# Patient Record
Sex: FEMALE | Race: WHITE | Employment: FULL TIME | URBAN - NONMETROPOLITAN AREA
[De-identification: names, ages, dates, MRNs, and addresses within clinical notes are randomized per-mention and may not be internally consistent; named-entity substitution may affect disease eponyms.]

---

## 2021-08-26 ENCOUNTER — OFFICE VISIT (OUTPATIENT)
Dept: OBGYN CLINIC | Age: 31
End: 2021-08-26
Payer: COMMERCIAL

## 2021-08-26 ENCOUNTER — HOSPITAL ENCOUNTER (OUTPATIENT)
Age: 31
Setting detail: SPECIMEN
Discharge: HOME | End: 2021-08-26
Payer: COMMERCIAL

## 2021-08-26 VITALS
WEIGHT: 197.4 LBS | BODY MASS INDEX: 31.72 KG/M2 | SYSTOLIC BLOOD PRESSURE: 118 MMHG | HEIGHT: 66 IN | DIASTOLIC BLOOD PRESSURE: 80 MMHG

## 2021-08-26 DIAGNOSIS — Z13.220 SCREENING FOR LIPID DISORDERS: ICD-10-CM

## 2021-08-26 DIAGNOSIS — N92.6 IRREGULAR MENSES: ICD-10-CM

## 2021-08-26 DIAGNOSIS — N93.0 PCB (POST COITAL BLEEDING): ICD-10-CM

## 2021-08-26 DIAGNOSIS — Z01.419 WOMEN'S ANNUAL ROUTINE GYNECOLOGICAL EXAMINATION: Primary | ICD-10-CM

## 2021-08-26 LAB
DIRECT EXAM: NORMAL
Lab: NORMAL
SPECIMEN DESCRIPTION: NORMAL

## 2021-08-26 PROCEDURE — 99385 PREV VISIT NEW AGE 18-39: CPT | Performed by: NURSE PRACTITIONER

## 2021-08-26 NOTE — PROGRESS NOTES
YEARLY PHYSICAL    Date of service: 2021    Elle Bar  Is a 32 y.o.  female    PT's PCP is: No primary care provider on file. : 1990                                             Subjective:       Patient's last menstrual period was 2021. Are your menses regular: no    OB History    Para Term  AB Living   2 2 2     3   SAB TAB Ectopic Molar Multiple Live Births           1 3      # Outcome Date GA Lbr Husam/2nd Weight Sex Delivery Anes PTL Lv   2A Term  37w0d  6 lb (2.722 kg) M Vag-Spont   ALTAF   2B Term  37w0d  5 lb 14 oz (2.665 kg) F    ALTAF   1 Term  41w0d  7 lb 11 oz (3.487 kg) M Vag-Spont   ALTAF        Social History     Tobacco Use   Smoking Status Never Smoker   Smokeless Tobacco Never Used        Social History     Substance and Sexual Activity   Alcohol Use Yes    Comment: occ       Family History   Problem Relation Age of Onset    Pancreatic Cancer Paternal Grandmother     Diabetes Maternal Grandmother     Cancer Maternal Grandfather         skin    Breast Cancer Maternal Aunt        Any family history of breast or ovarian cancer: Yes    Any family history of blood clots: No    Allergies: Latex and Ceclor [cefaclor]    No current outpatient medications on file.     Social History     Substance and Sexual Activity   Sexual Activity Yes    Partners: Male    Birth control/protection: Surgical    Comment: Tubal       Any bleeding or pain with intercourse: No    Last Yearly:  2015    Last pap: , negative     Last HPV: 2017, negative     Last Mammogram: n/a    Do you do self breast exams: encouraged monthly SBE     Past Medical History:   Diagnosis Date    Abnormal Pap smear of cervix     LGSIL    H/O Bell's palsy     H/O human papillomavirus infection     History of blood transfusion     after twin delivery    Migraine        Past Surgical History:   Procedure Laterality Date    MOUTH SURGERY      OVARIAN CYST REMOVAL      TONSILLECTOMY      TUBAL LIGATION         Family History   Problem Relation Age of Onset    Pancreatic Cancer Paternal Grandmother     Diabetes Maternal Grandmother     Cancer Maternal Grandfather         skin    Breast Cancer Maternal Aunt        Chief Complaint   Patient presents with    Gynecologic Exam     Last pap 4/3/17. Reports menses every 2-3 wks. Reports mood changes and PMS sx. PE:  Vital Signs  Blood pressure 118/80, height 5' 6\" (1.676 m), weight 197 lb 6.4 oz (89.5 kg), last menstrual period 08/20/2021. Estimated body mass index is 31.86 kg/m² as calculated from the following:    Height as of this encounter: 5' 6\" (1.676 m). Weight as of this encounter: 197 lb 6.4 oz (89.5 kg). HPI: Patient presents today for annual exam. Denies breast/pelvic pain. Due for pap. Reports monogamous relationship. Needs lipid screening for insurance program. Additionally, she has complaints off irregular, heavy menses every 2-3 weeks lasting approximately 5 days over the past year. Reports postcoital bleeding more instances than not. Experiences mood swings, fatigue, bloating prior to menses. Review of Systems   Genitourinary: Positive for menstrual problem. Negative for difficulty urinating, dysuria, frequency, pelvic pain, vaginal bleeding and vaginal discharge. Postcoital bleeding  PMS symptoms     All other systems reviewed and are negative. Objective  Heent:   negative   Cor: regular rate and rhythm, no murmurs              Pul:clear to auscultation bilaterally- no wheezes, rales or rhonchi, normal air movement, no respiratory distress      GI: Abdomen soft, non-tender.  BS normal. No masses,  No organomegaly           Extremities: normal strength, tone, and muscle mass, ROM of all joints is normal   Breasts: Breast:normal appearance, no masses or tenderness, No nipple retraction or dimpling, No nipple discharge or bleeding   Pelvic Exam: GENITAL/URINARY:  External Genitalia:  General appearance; normal, Hair distribution; normal, Lesions absent  Urethral Meatus:  Size normal, Location normal, Lesions absent, Prolapse absent  Urethra: Fullness absent, Masses absent  Bladder:  Fullness absent, Masses absent, Tenderness absent, Cystocele absent  Vagina:  General appearance normal, Estrogen effect normal, Discharge absent, Lesions absent, Pelvic support normal  Cervix:  General appearance normal, Lesions absent, Discharge absent, Tenderness absent, Enlargement absent, Nodularity absent  Uterus:  Size normal, Tenderness absent  Adenexa: Masses absent, Tenderness absent  Anus/Perineum:  Lesions absent and Masses absent                                    Vaginal discharge: no vaginal discharge   Chaperone: not present                         Assessment and Plan          Diagnosis Orders   1. Women's annual routine gynecological examination  PAP SMEAR   2. Irregular menses  TSH With Reflex Ft4   3. PCB (post coital bleeding)  Vaginitis DNA Probe   4. Screening for lipid disorders  Lipid, Fasting       Patient needs to call day one of menses for day 7-9 ultrasound. Will complete labs together when she can return fasting. Naima Ryder does not currently have medications on file. Return for gyn US day 7-9 . She was also counseled on her preventative health maintenance recommendations and follow-up. There are no Patient Instructions on file for this visit.     LEIGH Simental NP,8/26/2021 9:40 AM

## 2021-08-27 LAB
HPV SAMPLE: NORMAL
HPV, GENOTYPE 16: NOT DETECTED
HPV, GENOTYPE 18: NOT DETECTED
HPV, HIGH RISK OTHER: NOT DETECTED
HPV, INTERPRETATION: NORMAL
SPECIMEN DESCRIPTION: NORMAL

## 2021-09-07 LAB — CYTOLOGY REPORT: NORMAL

## 2021-09-16 ENCOUNTER — OFFICE VISIT (OUTPATIENT)
Dept: OBGYN CLINIC | Age: 31
End: 2021-09-16
Payer: COMMERCIAL

## 2021-09-16 VITALS — BODY MASS INDEX: 31.15 KG/M2 | SYSTOLIC BLOOD PRESSURE: 115 MMHG | DIASTOLIC BLOOD PRESSURE: 70 MMHG | WEIGHT: 193 LBS

## 2021-09-16 DIAGNOSIS — N80.03 ADENOMYOSIS: ICD-10-CM

## 2021-09-16 DIAGNOSIS — N92.6 IRREGULAR PERIODS/MENSTRUAL CYCLES: Primary | ICD-10-CM

## 2021-09-16 DIAGNOSIS — N93.0 POSTCOITAL BLEEDING: ICD-10-CM

## 2021-09-16 PROCEDURE — 36415 COLL VENOUS BLD VENIPUNCTURE: CPT | Performed by: NURSE PRACTITIONER

## 2021-09-16 PROCEDURE — 99213 OFFICE O/P EST LOW 20 MIN: CPT | Performed by: NURSE PRACTITIONER

## 2021-09-16 ASSESSMENT — ENCOUNTER SYMPTOMS: GASTROINTESTINAL NEGATIVE: 1

## 2021-09-16 NOTE — PROGRESS NOTES
PROBLEM VISIT     Date of service: 2021    Rodo Grimm  Is a 32 y.o. female    PT's PCP is: No primary care provider on file. : 1990                                             Subjective:       Patient's last menstrual period was 2021. OB History    Para Term  AB Living   2 2 2     3   SAB TAB Ectopic Molar Multiple Live Births           1 3      # Outcome Date GA Lbr Husam/2nd Weight Sex Delivery Anes PTL Lv   2A Term  37w0d  6 lb (2.722 kg) M Vag-Spont   ALTAF   2B Term  37w0d  5 lb 14 oz (2.665 kg) F    ALTAF   1 Term  41w0d  7 lb 11 oz (3.487 kg) M Vag-Spont   ALTAF        Social History     Tobacco Use   Smoking Status Never Smoker   Smokeless Tobacco Never Used        Social History     Substance and Sexual Activity   Alcohol Use Yes    Comment: occ       Allergies: Latex and Ceclor [cefaclor]    No current outpatient medications on file. Social History     Substance and Sexual Activity   Sexual Activity Yes    Partners: Male    Birth control/protection: Surgical    Comment: Tubal     Chief Complaint   Patient presents with    Follow-up     Follow up usn for irregular menses. PE:  Vital Signs  Blood pressure 115/70, weight 193 lb (87.5 kg), last menstrual period 2021. HPI: Patient here for ultrasound follow up. Over the past year she has been having irregular menses, states cycles every 21-28 days. Reports had menses , which last 5 days and was light in flow and then again  with heavier bleeding. PT denies fever, chills, nausea and vomiting       Review of Systems   Constitutional: Negative. Gastrointestinal: Negative. Genitourinary: Positive for menstrual problem. Negative for dysuria, frequency, pelvic pain, vaginal bleeding and vaginal discharge. PCB       Physical Exam  Constitutional:       Appearance: Normal appearance. HENT:      Head: Normocephalic.    Pulmonary:      Effort: Pulmonary effort is normal. Musculoskeletal:         General: Normal range of motion. Neurological:      General: No focal deficit present. Mental Status: She is alert. Psychiatric:         Mood and Affect: Mood normal.         Behavior: Behavior normal.     HETEROGENEOUS APPEARING UTERUS MEASURING 8.6 X 4.9 X 3.8 CM  ENDO = 11.2 MM  BILATERAL OVARIES WNL    Assessment and Plan          Diagnosis Orders   1. Irregular periods/menstrual cycles  Insulin, Total    Follicle Stimulating Hormone   2. Adenomyosis     3. Postcoital bleeding       usn reviewed- discussed adenomyosis and related symptoms. Discussed endo lining is 11.2 mm on day 6 of cycle, discussed repeating ultrasound following next cycle to assure lining sheds appropriately. Discussed possible need for endo bx. Patient will be going to [address] following appt today for labs. Vipul Mayfield Sridhar Plater does not currently have medications on file. Return for gyn US cycle day 7-9  . There are no Patient Instructions on file for this visit. Over 50% of time spent on counseling and care coordination on: see assessment and plan,  She was also counseled on her preventative health maintenance recommendations and follow-up.         FF time: 20 min      LEIGH Quintero NP,9/16/2021 8:42 AM

## 2021-09-28 ENCOUNTER — HOSPITAL ENCOUNTER (OUTPATIENT)
Age: 31
Setting detail: SPECIMEN
Discharge: HOME | End: 2021-09-28
Payer: COMMERCIAL

## 2021-09-28 DIAGNOSIS — N92.6 IRREGULAR PERIODS/MENSTRUAL CYCLES: ICD-10-CM

## 2021-09-28 DIAGNOSIS — N92.6 IRREGULAR MENSES: ICD-10-CM

## 2021-09-28 DIAGNOSIS — Z13.220 SCREENING FOR LIPID DISORDERS: ICD-10-CM

## 2021-09-28 LAB
CHOLESTEROL, FASTING: 179 MG/DL
CHOLESTEROL/HDL RATIO: 4.1
FOLLICLE STIMULATING HORMONE: 4.4 U/L (ref 1.7–21.5)
HDLC SERPL-MCNC: 44 MG/DL
INSULIN COMMENT: NORMAL
INSULIN REFERENCE RANGE:: NORMAL
INSULIN: 8.3 MU/L
LDL CHOLESTEROL: 125 MG/DL (ref 0–130)
TRIGLYCERIDE, FASTING: 52 MG/DL
TSH SERPL DL<=0.05 MIU/L-ACNC: 2.38 MIU/L (ref 0.3–5)
VLDLC SERPL CALC-MCNC: NORMAL MG/DL (ref 1–30)

## 2022-08-29 ENCOUNTER — OFFICE VISIT (OUTPATIENT)
Dept: OBGYN CLINIC | Age: 32
End: 2022-08-29
Payer: COMMERCIAL

## 2022-08-29 VITALS
BODY MASS INDEX: 33.04 KG/M2 | HEIGHT: 66 IN | DIASTOLIC BLOOD PRESSURE: 69 MMHG | SYSTOLIC BLOOD PRESSURE: 108 MMHG | WEIGHT: 205.6 LBS

## 2022-08-29 DIAGNOSIS — Z01.419 WOMEN'S ANNUAL ROUTINE GYNECOLOGICAL EXAMINATION: Primary | ICD-10-CM

## 2022-08-29 PROCEDURE — 99395 PREV VISIT EST AGE 18-39: CPT | Performed by: NURSE PRACTITIONER

## 2022-08-29 ASSESSMENT — ENCOUNTER SYMPTOMS
SHORTNESS OF BREATH: 0
ABDOMINAL PAIN: 0
DIARRHEA: 0
CONSTIPATION: 0

## 2022-08-29 NOTE — PROGRESS NOTES
Right Adnexa: not tender and no mass present. Left Adnexa: not tender and no mass present. No cervical motion tenderness, discharge or friability. No parametrium nodularity present. Uterus is not enlarged or tender. No uterine mass detected. No urethral prolapse, tenderness or mass present. Breasts:     Right: No mass, nipple discharge, skin change or tenderness. Left: No mass, nipple discharge, skin change or tenderness. HENT:      Head: Normocephalic and atraumatic. Eyes:      Extraocular Movements: Extraocular movements intact. Pupils: Pupils are equal, round, and reactive to light. Cardiovascular:      Rate and Rhythm: Normal rate. Pulmonary:      Effort: Pulmonary effort is normal.   Abdominal:      Palpations: Abdomen is soft. Tenderness: no abdominal tenderness There is no guarding or rebound. Musculoskeletal:         General: Normal range of motion. Neurological:      General: No focal deficit present. Mental Status: She is alert and oriented to person, place, and time. Skin:     General: Skin is warm and dry. Psychiatric:         Mood and Affect: Mood normal.         Behavior: Behavior normal.                       Assessment and Plan          Diagnosis Orders   1. Women's annual routine gynecological examination            Repeat Annual every 1 year  Cervical Cytology Evaluation begins at 24years old. If Negative Cytology, Follow-up screening per current guidelines. Mammograms every 1year. If 35 yo and last mammogram was negative. Routine healthmaintenance per patients PCP. Grover Whitman does not currently have medications on file. Return in about 1 year (around 8/29/2023) for yearly. She was also counseled on her preventative health maintenance recommendations and follow-up. There are no Patient Instructions on file for this visit.     LEIGH Price NP,8/29/2022 9:17 AM

## 2023-08-29 ENCOUNTER — OFFICE VISIT (OUTPATIENT)
Dept: OBGYN CLINIC | Age: 33
End: 2023-08-29
Payer: COMMERCIAL

## 2023-08-29 VITALS
DIASTOLIC BLOOD PRESSURE: 74 MMHG | WEIGHT: 208.4 LBS | HEIGHT: 66 IN | SYSTOLIC BLOOD PRESSURE: 109 MMHG | BODY MASS INDEX: 33.49 KG/M2

## 2023-08-29 DIAGNOSIS — Z01.419 WOMEN'S ANNUAL ROUTINE GYNECOLOGICAL EXAMINATION: Primary | ICD-10-CM

## 2023-08-29 PROCEDURE — 99395 PREV VISIT EST AGE 18-39: CPT | Performed by: NURSE PRACTITIONER

## 2023-08-29 ASSESSMENT — ENCOUNTER SYMPTOMS
CONSTIPATION: 0
ABDOMINAL PAIN: 0
DIARRHEA: 0
SHORTNESS OF BREATH: 0

## 2023-08-29 NOTE — PROGRESS NOTES
YEARLY PHYSICAL    Date of service: 2023    Justin Garcia  Is a 35 y.o. female    PT's PCP is: No primary care provider on file. : 1990                                         Chaperone for Intimate Exam  Chaperone was offered as part of the rooming process. Patient declined and agrees to continue with exam without a chaperone. Chaperone: n/a      Subjective:       Patient's last menstrual period was 08/15/2023. Are your menses regular: yes    OB History    Para Term  AB Living   2 2 2     3   SAB IAB Ectopic Molar Multiple Live Births           1 3      # Outcome Date GA Lbr Husam/2nd Weight Sex Delivery Anes PTL Lv   2A Term  37w0d  6 lb (2.722 kg) M Vag-Spont   ALTAF   2B Term  37w0d  5 lb 14 oz (2.665 kg) F    ALTAF   1 Term  41w0d  7 lb 11 oz (3.487 kg) M Vag-Spont   ALTAF        Social History     Tobacco Use   Smoking Status Never   Smokeless Tobacco Never        Social History     Substance and Sexual Activity   Alcohol Use Yes    Comment: occ       Family History   Problem Relation Age of Onset    Pancreatic Cancer Paternal Grandmother     Diabetes Maternal Grandmother     Cancer Maternal Grandfather         skin    Breast Cancer Maternal Aunt        Any family history of breast or ovarian cancer: Yes    Any family history of blood clots: No      Allergies: Latex and Ceclor [cefaclor]    No current outpatient medications on file.     Social History     Substance and Sexual Activity   Sexual Activity Yes    Partners: Male    Birth control/protection: Surgical    Comment: Tubal       Any bleeding or pain with intercourse: No    Last Yearly:  22    Last pap: 21-neg    Last HPV: 21-neg    Last Mammogram: n/a    Last Dexascan n/a    Last colorectal screen- n/a    Do you do self breast exams: encouraged     Past Medical History:   Diagnosis Date    Abnormal Pap smear of cervix 2013    LGSIL

## 2024-09-03 ENCOUNTER — OFFICE VISIT (OUTPATIENT)
Dept: OBGYN CLINIC | Age: 34
End: 2024-09-03
Payer: COMMERCIAL

## 2024-09-03 ENCOUNTER — HOSPITAL ENCOUNTER (OUTPATIENT)
Age: 34
Setting detail: SPECIMEN
Discharge: HOME | End: 2024-09-03

## 2024-09-03 VITALS
BODY MASS INDEX: 35.07 KG/M2 | DIASTOLIC BLOOD PRESSURE: 74 MMHG | WEIGHT: 218.2 LBS | HEIGHT: 66 IN | SYSTOLIC BLOOD PRESSURE: 122 MMHG

## 2024-09-03 DIAGNOSIS — R10.2 PELVIC PAIN: ICD-10-CM

## 2024-09-03 DIAGNOSIS — Z01.419 WOMEN'S ANNUAL ROUTINE GYNECOLOGICAL EXAMINATION: Primary | ICD-10-CM

## 2024-09-03 PROCEDURE — 99395 PREV VISIT EST AGE 18-39: CPT | Performed by: NURSE PRACTITIONER

## 2024-09-03 ASSESSMENT — ENCOUNTER SYMPTOMS
DIARRHEA: 0
ABDOMINAL DISTENTION: 1
SHORTNESS OF BREATH: 0
VOMITING: 0
CONSTIPATION: 1
ABDOMINAL PAIN: 0
NAUSEA: 0

## 2024-09-03 NOTE — PROGRESS NOTES
YEARLY PHYSICAL    Date of service: 9/3/2024    Lizzie Camarillo  Is a 34 y.o. female    PT's PCP is: No primary care provider on file.     : 1990                                         Chaperone for Intimate Exam  Chaperone was offered as part of the rooming process. Patient declined and agrees to continue with exam without a chaperone.  Chaperone: N/A      Subjective:       Patient's last menstrual period was 2024.     Are your menses regular: yes    OB History    Para Term  AB Living   2 2 2     3   SAB IAB Ectopic Molar Multiple Live Births           1 3      # Outcome Date GA Lbr Husam/2nd Weight Sex Type Anes PTL Lv   2A Term  37w0d  2.722 kg (6 lb) M Vag-Spont   ALTAF   2B Term  37w0d  2.665 kg (5 lb 14 oz) F    ALTAF   1 Term  41w0d  3.487 kg (7 lb 11 oz) M Vag-Spont   ALTAF        Social History     Tobacco Use   Smoking Status Never   Smokeless Tobacco Never        Social History     Substance and Sexual Activity   Alcohol Use Yes    Comment: occ       Family History   Problem Relation Age of Onset    Pancreatic Cancer Paternal Grandmother     Diabetes Maternal Grandmother     Cancer Maternal Grandfather         skin    Breast Cancer Maternal Aunt        Any family history of breast or ovarian cancer: Yes    Any family history of blood clots: No      Allergies: Latex and Ceclor [cefaclor]    No current outpatient medications on file.    Social History     Substance and Sexual Activity   Sexual Activity Yes    Partners: Male    Birth control/protection: Surgical    Comment: Tubal       Any bleeding or pain with intercourse: No    Last Yearly:  23    Last pap: 21-neg    Last HPV: 21-neg    Last Mammogram: n/a    Last Dexascan n/a    Last colorectal screen- n/a    Do you do self breast exams: encouraged     Past Medical History:   Diagnosis Date    Abnormal Pap smear of cervix 2013    LGSIL    H/O

## 2024-09-05 LAB
HPV I/H RISK 4 DNA CVX QL NAA+PROBE: NOT DETECTED
HPV SAMPLE: NORMAL
HPV, INTERPRETATION: NORMAL
HPV16 DNA CVX QL NAA+PROBE: NOT DETECTED
HPV18 DNA CVX QL NAA+PROBE: NOT DETECTED
SPECIMEN DESCRIPTION: NORMAL

## 2024-09-12 LAB — CYTOLOGY REPORT: NORMAL

## 2024-09-16 ENCOUNTER — OFFICE VISIT (OUTPATIENT)
Dept: OBGYN CLINIC | Age: 34
End: 2024-09-16
Payer: COMMERCIAL

## 2024-09-16 VITALS — SYSTOLIC BLOOD PRESSURE: 120 MMHG | WEIGHT: 218 LBS | DIASTOLIC BLOOD PRESSURE: 72 MMHG | BODY MASS INDEX: 35.19 KG/M2

## 2024-09-16 DIAGNOSIS — R14.0 BLOATING: ICD-10-CM

## 2024-09-16 DIAGNOSIS — R10.2 PELVIC PAIN: Primary | ICD-10-CM

## 2024-09-16 PROCEDURE — 99213 OFFICE O/P EST LOW 20 MIN: CPT | Performed by: NURSE PRACTITIONER

## 2024-09-16 ASSESSMENT — ENCOUNTER SYMPTOMS
ABDOMINAL DISTENTION: 1
RESPIRATORY NEGATIVE: 1

## 2025-09-04 ENCOUNTER — OFFICE VISIT (OUTPATIENT)
Dept: OBGYN CLINIC | Age: 35
End: 2025-09-04

## 2025-09-04 VITALS
SYSTOLIC BLOOD PRESSURE: 118 MMHG | HEIGHT: 66 IN | BODY MASS INDEX: 37.54 KG/M2 | DIASTOLIC BLOOD PRESSURE: 72 MMHG | WEIGHT: 233.6 LBS

## 2025-09-04 DIAGNOSIS — Z01.419 WOMEN'S ANNUAL ROUTINE GYNECOLOGICAL EXAMINATION: Primary | ICD-10-CM

## 2025-09-04 DIAGNOSIS — R63.5 WEIGHT GAIN: ICD-10-CM

## 2025-09-04 DIAGNOSIS — E66.9 OBESITY (BMI 35.0-39.9 WITHOUT COMORBIDITY): ICD-10-CM

## 2025-09-04 RX ORDER — PHENTERMINE HYDROCHLORIDE 37.5 MG/1
37.5 TABLET ORAL
Qty: 30 TABLET | Refills: 0 | Status: SHIPPED | OUTPATIENT
Start: 2025-09-04 | End: 2025-10-04

## 2025-09-04 SDOH — HEALTH STABILITY: MENTAL HEALTH: HOW OFTEN DO YOU HAVE A DRINK CONTAINING ALCOHOL?: MONTHLY OR LESS

## 2025-09-04 SDOH — SOCIAL STABILITY: SOCIAL NETWORK
DO YOU BELONG TO ANY CLUBS OR ORGANIZATIONS SUCH AS CHURCH GROUPS, UNIONS, FRATERNAL OR ATHLETIC GROUPS, OR SCHOOL GROUPS?: YES

## 2025-09-04 SDOH — SOCIAL STABILITY: SOCIAL INSECURITY
WITHIN THE LAST YEAR, HAVE YOU BEEN KICKED, HIT, SLAPPED, OR OTHERWISE PHYSICALLY HURT BY YOUR PARTNER OR EX-PARTNER?: NO

## 2025-09-04 SDOH — SOCIAL STABILITY: SOCIAL NETWORK: IN A TYPICAL WEEK, HOW MANY TIMES DO YOU TALK ON THE PHONE WITH FAMILY, FRIENDS, OR NEIGHBORS?: TWICE A WEEK

## 2025-09-04 SDOH — ECONOMIC STABILITY: FOOD INSECURITY: WITHIN THE PAST 12 MONTHS, YOU WORRIED THAT YOUR FOOD WOULD RUN OUT BEFORE YOU GOT THE MONEY TO BUY MORE.: NEVER TRUE

## 2025-09-04 SDOH — HEALTH STABILITY: PHYSICAL HEALTH: ON AVERAGE, HOW MANY MINUTES DO YOU ENGAGE IN EXERCISE AT THIS LEVEL?: 30 MIN

## 2025-09-04 SDOH — HEALTH STABILITY: PHYSICAL HEALTH: ON AVERAGE, HOW MANY DAYS PER WEEK DO YOU ENGAGE IN MODERATE TO STRENUOUS EXERCISE (LIKE A BRISK WALK)?: 3 DAYS

## 2025-09-04 SDOH — SOCIAL STABILITY: SOCIAL INSECURITY
WITHIN THE LAST YEAR, HAVE YOU BEEN RAPED OR FORCED TO HAVE ANY KIND OF SEXUAL ACTIVITY BY YOUR PARTNER OR EX-PARTNER?: NO

## 2025-09-04 SDOH — HEALTH STABILITY: MENTAL HEALTH
DO YOU FEEL STRESS - TENSE, RESTLESS, NERVOUS, OR ANXIOUS, OR UNABLE TO SLEEP AT NIGHT BECAUSE YOUR MIND IS TROUBLED ALL THE TIME - THESE DAYS?: TO SOME EXTENT

## 2025-09-04 SDOH — HEALTH STABILITY: MENTAL HEALTH: HOW MANY DRINKS CONTAINING ALCOHOL DO YOU HAVE ON A TYPICAL DAY WHEN YOU ARE DRINKING?: PATIENT DOES NOT DRINK

## 2025-09-04 SDOH — ECONOMIC STABILITY: FOOD INSECURITY: HOW HARD IS IT FOR YOU TO PAY FOR THE VERY BASICS LIKE FOOD, HOUSING, MEDICAL CARE, AND HEATING?: SOMEWHAT HARD

## 2025-09-04 SDOH — SOCIAL STABILITY: SOCIAL NETWORK: HOW OFTEN DO YOU ATTEND CHURCH OR RELIGIOUS SERVICES?: MORE THAN 4 TIMES PER YEAR

## 2025-09-04 SDOH — ECONOMIC STABILITY: HOUSING INSECURITY: IN THE LAST 12 MONTHS, WAS THERE A TIME WHEN YOU WERE NOT ABLE TO PAY THE MORTGAGE OR RENT ON TIME?: NO

## 2025-09-04 SDOH — SOCIAL STABILITY: SOCIAL INSECURITY: ARE YOU MARRIED, WIDOWED, DIVORCED, SEPARATED, NEVER MARRIED, OR LIVING WITH A PARTNER?: MARRIED

## 2025-09-04 SDOH — ECONOMIC STABILITY: FOOD INSECURITY: WITHIN THE PAST 12 MONTHS, THE FOOD YOU BOUGHT JUST DIDN'T LAST AND YOU DIDN'T HAVE MONEY TO GET MORE.: NEVER TRUE

## 2025-09-04 SDOH — SOCIAL STABILITY: SOCIAL INSECURITY: WITHIN THE LAST YEAR, HAVE YOU BEEN AFRAID OF YOUR PARTNER OR EX-PARTNER?: NO

## 2025-09-04 SDOH — SOCIAL STABILITY: SOCIAL INSECURITY: WITHIN THE LAST YEAR, HAVE YOU BEEN HUMILIATED OR EMOTIONALLY ABUSED IN OTHER WAYS BY YOUR PARTNER OR EX-PARTNER?: NO

## 2025-09-04 SDOH — SOCIAL STABILITY: SOCIAL NETWORK: HOW OFTEN DO YOU ATTEND MEETINGS OF THE CLUBS OR ORGANIZATIONS YOU BELONG TO?: 1 TO 4 TIMES PER YEAR

## 2025-09-04 SDOH — SOCIAL STABILITY: SOCIAL NETWORK: HOW OFTEN DO YOU GET TOGETHER WITH FRIENDS OR RELATIVES?: ONCE A WEEK

## 2025-09-04 SDOH — ECONOMIC STABILITY: TRANSPORTATION INSECURITY: IN THE PAST 12 MONTHS, HAS LACK OF TRANSPORTATION KEPT YOU FROM MEDICAL APPOINTMENTS OR FROM GETTING MEDICATIONS?: NO

## 2025-09-04 ASSESSMENT — ENCOUNTER SYMPTOMS
SHORTNESS OF BREATH: 0
CONSTIPATION: 0
ABDOMINAL PAIN: 0
DIARRHEA: 0

## 2025-09-04 ASSESSMENT — ACTIVITIES OF DAILY LIVING (ADL): LACK_OF_TRANSPORTATION: NO
